# Patient Record
Sex: MALE | Race: OTHER | NOT HISPANIC OR LATINO | Employment: OTHER | URBAN - METROPOLITAN AREA
[De-identification: names, ages, dates, MRNs, and addresses within clinical notes are randomized per-mention and may not be internally consistent; named-entity substitution may affect disease eponyms.]

---

## 2024-02-09 ENCOUNTER — OFFICE VISIT (OUTPATIENT)
Dept: UROLOGY | Facility: CLINIC | Age: 29
End: 2024-02-09
Payer: MEDICAID

## 2024-02-09 VITALS
HEART RATE: 82 BPM | DIASTOLIC BLOOD PRESSURE: 76 MMHG | TEMPERATURE: 97 F | WEIGHT: 175.25 LBS | BODY MASS INDEX: 29.92 KG/M2 | HEIGHT: 64 IN | RESPIRATION RATE: 18 BRPM | SYSTOLIC BLOOD PRESSURE: 122 MMHG

## 2024-02-09 DIAGNOSIS — N46.9 MALE INFERTILITY: Primary | ICD-10-CM

## 2024-02-09 PROCEDURE — 99999 PR PBB SHADOW E&M-NEW PATIENT-LVL III: CPT | Mod: PBBFAC,,, | Performed by: UROLOGY

## 2024-02-09 PROCEDURE — 99203 OFFICE O/P NEW LOW 30 MIN: CPT | Mod: PBBFAC | Performed by: UROLOGY

## 2024-02-09 PROCEDURE — 99499 UNLISTED E&M SERVICE: CPT | Mod: S$PBB,,, | Performed by: UROLOGY

## 2024-02-09 NOTE — PROGRESS NOTES
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  I discussed that we do not have a provider locally providing these services.  He will attempt to find another provider.

## 2024-03-22 ENCOUNTER — TELEPHONE (OUTPATIENT)
Dept: UROLOGY | Facility: CLINIC | Age: 29
End: 2024-03-22
Payer: MEDICAID

## 2024-03-22 NOTE — TELEPHONE ENCOUNTER
Called and spoke with Allie. They would like a referral to [facility].  Informed that Dr. Loo was gone for the day but would send him the message and let them know next week.  Voiced understanding.    Salina Azevedo LPN    ----- Message from Lore Pagan sent at 3/22/2024  4:40 PM CDT -----  Contact: allie Bernal is calling regarding a call back.  Please give her a call back at   [phone]

## 2024-03-25 ENCOUNTER — TELEPHONE (OUTPATIENT)
Dept: UROLOGY | Facility: CLINIC | Age: 29
End: 2024-03-25
Payer: MEDICAID

## 2024-03-25 NOTE — TELEPHONE ENCOUNTER
Pt requesting to be seen for male infertility. Provided with number for central scheduling to find a provider that offers these services. Pt verbalized understanding.     ----- Message from Mercedes Murguia sent at 3/25/2024 11:25 AM CDT -----  Regarding: call back  Type: Patient Call Back    Who called: jaxson Webster     What is the request in detail: requesting call back to see if provider accepts new Medicaid pts;  pt speaks Thai and understands English but Eleanor usually acts as his intermediary when dealing with medical providers; states pt has completed a HIPAA release for her     Can the clinic reply by [facility]?no    Would the patient rather a call back or a response via [facility]? call    Best call back number: [phone]     Additional Information:

## 2024-03-25 NOTE — TELEPHONE ENCOUNTER
Returned call. Spoke to friend and provided central scheduling number.     ----- Message from Georgette Shields MA sent at 3/25/2024  1:51 PM CDT -----  Contact: jessica    ----- Message -----  From: Tamera Lieberman  Sent: 3/25/2024   1:40 PM CDT  To: [facility] Pierre Staff    Type:  Patient Call          Who Called: patient friend -Jessica         Does the patient know what this is regarding?: requesting a call back from a missed call ; please           Would the patient rather a call back or a response via [facility]?call           Best Call Back Number: [phone]            Additional Information: